# Patient Record
Sex: MALE | Race: WHITE | NOT HISPANIC OR LATINO | Employment: FULL TIME | URBAN - METROPOLITAN AREA
[De-identification: names, ages, dates, MRNs, and addresses within clinical notes are randomized per-mention and may not be internally consistent; named-entity substitution may affect disease eponyms.]

---

## 2023-10-16 ENCOUNTER — OFFICE VISIT (OUTPATIENT)
Dept: PRIMARY CARE | Facility: CLINIC | Age: 23
End: 2023-10-16
Payer: COMMERCIAL

## 2023-10-16 VITALS
HEART RATE: 76 BPM | BODY MASS INDEX: 19.1 KG/M2 | SYSTOLIC BLOOD PRESSURE: 126 MMHG | TEMPERATURE: 100 F | HEIGHT: 68 IN | WEIGHT: 126 LBS | OXYGEN SATURATION: 99 % | DIASTOLIC BLOOD PRESSURE: 62 MMHG

## 2023-10-16 DIAGNOSIS — R22.0 RIGHT FACIAL SWELLING: Primary | ICD-10-CM

## 2023-10-16 DIAGNOSIS — R09.81 NASAL CONGESTION: ICD-10-CM

## 2023-10-16 DIAGNOSIS — J34.89 RHINORRHEA: ICD-10-CM

## 2023-10-16 PROCEDURE — 99203 OFFICE O/P NEW LOW 30 MIN: CPT | Performed by: FAMILY MEDICINE

## 2023-10-16 PROCEDURE — 1036F TOBACCO NON-USER: CPT | Performed by: FAMILY MEDICINE

## 2023-10-16 RX ORDER — AMOXICILLIN AND CLAVULANATE POTASSIUM 875; 125 MG/1; MG/1
875 TABLET, FILM COATED ORAL 2 TIMES DAILY
Qty: 20 TABLET | Refills: 0 | Status: SHIPPED | OUTPATIENT
Start: 2023-10-16 | End: 2023-10-26

## 2023-10-16 ASSESSMENT — PAIN SCALES - GENERAL: PAINLEVEL: 5

## 2023-10-16 ASSESSMENT — ENCOUNTER SYMPTOMS
FEVER: 0
CHILLS: 0
SINUS PAIN: 1
RHINORRHEA: 1
HEADACHES: 1
SORE THROAT: 0

## 2023-10-16 NOTE — PROGRESS NOTES
"Subjective   Patient ID: Tony Marroquin is a 23 y.o. male who presents for Facial Swelling (Right side x 3 days ).    The patient is new to the practice and presents with right facial swelling that began 3 days ago.  He denies any trauma to the face.  At first he thought it was a pimple as it was painful.  It never turned into a pustule.  He also admits to a headache, right facial pain (worse today), and nasal congestion with yellow and clear rhinorrhea.  He denies fever, throat pain, otalgia, ear popping, and vision changes.  He has taken Advil and an OTC sinus medication with little relief.      Review of Systems   Constitutional:  Negative for chills and fever.   HENT:  Positive for congestion, rhinorrhea and sinus pain. Negative for ear pain and sore throat.         Negative for vision change.   Neurological:  Positive for headaches.     Objective   /62 (BP Location: Left arm)   Pulse 76   Temp 37.8 °C (100 °F) (Temporal)   Ht 1.727 m (5' 8\")   Wt 57.2 kg (126 lb)   SpO2 99%   BMI 19.16 kg/m²     Physical Exam  Constitutional:       Appearance: Normal appearance.   HENT:      Head: Right periorbital erythema present.      Comments: Swelling of the right upper and lower eyelids.       Right Ear: Tympanic membrane, ear canal and external ear normal.      Left Ear: Tympanic membrane, ear canal and external ear normal.      Nose: Congestion and rhinorrhea present. Rhinorrhea is clear.      Right Turbinates: Swollen.      Left Turbinates: Swollen.      Right Sinus: Maxillary sinus tenderness and frontal sinus tenderness present.      Comments: No nasal mucosa erythema.      Mouth/Throat:      Mouth: Mucous membranes are moist.      Pharynx: Oropharynx is clear.   Eyes:      Conjunctiva/sclera: Conjunctivae normal.   Cardiovascular:      Rate and Rhythm: Normal rate and regular rhythm.   Pulmonary:      Effort: Pulmonary effort is normal.      Breath sounds: Normal breath sounds.   Neurological:      " Mental Status: He is alert.     Assessment/Plan   Problem List Items Addressed This Visit    None  Visit Diagnoses         Codes    Right facial swelling    -  Primary R22.0    Relevant Medications    amoxicillin-pot clavulanate (Augmentin) 875-125 mg tablet    Nasal congestion     R09.81    Relevant Medications    amoxicillin-pot clavulanate (Augmentin) 875-125 mg tablet    Rhinorrhea     J34.89    Relevant Medications    amoxicillin-pot clavulanate (Augmentin) 875-125 mg tablet        New.  Sinus related?  Augmentin prescribed.  OTC medications as needed.   Adequate hydration.  Get plenty of rest.  Follow up in 10 days with PCP if symptoms worsen.

## 2023-10-16 NOTE — LETTER
October 16, 2023     Patient: Tony Marroquin   YOB: 2000   Date of Visit: 10/16/2023       To Whom It May Concern:    Tony Marroquin was seen in my clinic on 10/16/2023 at 11:00 am. Please excuse Tony for his absence from work on this day to make the appointment.  He will also miss work tomorrow.      If you have any questions or concerns, please don't hesitate to call.         Sincerely,         Drea Avendano, DO        CC: No Recipients